# Patient Record
Sex: FEMALE | Race: WHITE
[De-identification: names, ages, dates, MRNs, and addresses within clinical notes are randomized per-mention and may not be internally consistent; named-entity substitution may affect disease eponyms.]

---

## 2019-12-19 ENCOUNTER — HOSPITAL ENCOUNTER (EMERGENCY)
Dept: HOSPITAL 88 - FSED | Age: 59
Discharge: HOME | End: 2019-12-19
Payer: COMMERCIAL

## 2019-12-19 VITALS — BODY MASS INDEX: 41.95 KG/M2 | HEIGHT: 66 IN | WEIGHT: 261 LBS

## 2019-12-19 DIAGNOSIS — F41.1: Primary | ICD-10-CM

## 2019-12-19 DIAGNOSIS — I10: ICD-10-CM

## 2019-12-19 DIAGNOSIS — Z95.0: ICD-10-CM

## 2019-12-19 PROCEDURE — 80053 COMPREHEN METABOLIC PANEL: CPT

## 2019-12-19 PROCEDURE — 84484 ASSAY OF TROPONIN QUANT: CPT

## 2019-12-19 PROCEDURE — 71046 X-RAY EXAM CHEST 2 VIEWS: CPT

## 2019-12-19 PROCEDURE — 82553 CREATINE MB FRACTION: CPT

## 2019-12-19 PROCEDURE — 85025 COMPLETE CBC W/AUTO DIFF WBC: CPT

## 2019-12-19 PROCEDURE — 99283 EMERGENCY DEPT VISIT LOW MDM: CPT

## 2019-12-19 PROCEDURE — 93005 ELECTROCARDIOGRAM TRACING: CPT

## 2019-12-19 NOTE — DIAGNOSTIC IMAGING REPORT
EXAMINATION:  CXR 2 VIEW - HOPD    



INDICATION:      

^[id number]

^2300



COMPARISON:  None

     

FINDINGS:  PA and lateral views



TUBES and LINES:  Dual-lead left chest wall cardiac device in place with distal

leads projecting over right atrium and right ventricle.



LUNGS:  Lungs are well inflated.  There is no evidence of pneumonia or

pulmonary edema.



PLEURA:  No pleural effusion or pneumothorax.



HEART AND MEDIASTINUM:  The cardiomediastinal silhouette is unremarkable.    



BONES AND SOFT TISSUES:  No acute osseous lesion.  Soft tissues are

unremarkable.



UPPER ABDOMEN: No free air under the diaphragm.    



IMPRESSION: 

No acute thoracic abnormality.





Signed by: Dr. Marshall Gonzales MD on 12/19/2019 11:28 PM

## 2019-12-20 NOTE — XMS REPORT
Patient Summary Document

                             Created on: 2019



ZEUS CARTER

External Reference #: [id number]

: 1960

Sex: Female



Demographics







                          Address                   [address] [place] [address] 

[place]  [address]

 

                          Home Phone                [phone]

 

                          Preferred Language        Unknown

 

                          Marital Status            Unknown

 

                          Yarsanism Affiliation     Unknown

 

                          Race                      Unknown

 

                          Ethnic Group              Unknown





Author







                          [facility]                    [facility]

 

                          Address                   Unknown

 

                          Phone                     Unavailable







Care Team Providers







                    Care Team Member Name    Role                Phone

 

                    TERRENCE MEMBRENO    Unavailable         Unavailable







Problems

This patient has no known problems.



Allergies, Adverse Reactions, Alerts

This patient has no known allergies or adverse reactions.



Medications

This patient has no known medications.



Results







           Test Description    Test Time    Test Comments    Text Results    Atomic Results    Result

 Comments

 

                CXR 2 VIEW - [facility]OPD    2019 23:26:00                                                         

                                                 [place] [address]      Patient Name: ZEUS CARTER                                   MR
#: [id number]                     : 1960                               
   Age/Sex: 59/F  Acct #: [id number]                              Req #: [id number]
[id number]  [facility] Physician:                                                      
Ordered by: TERRENCE MEMBRENO MD                            Report #: [id number]  
     Location: [facility]                                    Room/Bed:                
    
___________________________________________________________________________________________________
   Procedure: [id number] HOPD/CXR 2 VIEW - [facility]D  Exam Date: 19             
              Exam Time: 0                                              
REPORT STATUS: Signed    EXAMINATION:  CXR 2 VIEW - [facility]D          INDICATION:   
      2019      COMPARISON:  None           FINDINGS:  PA and 
lateral views      TUBES and LINES:  Dual-lead left chest wall cardiac device in
place with distal   leads projecting over right atrium and right ventricle.     
LUNGS:  Lungs are well inflated.  There is no evidence of pneumonia or   
pulmonary edema.      PLEURA:  No pleural effusion or pneumothorax.      HEART 
AND MEDIASTINUM:  The cardiomediastinal silhouette is unremarkable.          
BONES AND SOFT TISSUES:  No acute osseous lesion.  Soft tissues are   
unremarkable.      UPPER ABDOMEN: No free air under the diaphragm.          
IMPRESSION:    No acute thoracic abnormality.         Signed by: Dr. Marshall Gonzales MD on 2019 11:28 PM        Dictated By: MARSHALL GONZALES MD  
Electronically Signed By: MARSHALL GONZALES MD on 19  Transcribed By: 
JUAN FRANCISCO on 19       COPY TO:   TERRENCE MEMBRENO MD